# Patient Record
(demographics unavailable — no encounter records)

---

## 2024-12-02 NOTE — HISTORY OF PRESENT ILLNESS
[TextBox_4] :  (2 NVD's) LMP 24 Perineal tenderness ~1 wk ago. Entire vulva felt "hard" & tender - like riding a bike - mostly on left side. Saw her PCP who prescribed Bactrim. U/A, C&S showed Strep A UTI. Completing 7 days of abx tonight. Vulva much better. Only a small spot is still hard - on the left. No prior such episode.

## 2024-12-02 NOTE — PHYSICAL EXAM
[Chaperone Present] : A chaperone was present in the examining room during all aspects of the physical examination [No Lymphadenopathy] : no lymphadenopathy [Soft] : soft [Non-tender] : non-tender [No HSM] : No HSM [No Mass] : no mass [39679] : A chaperone was present during the pelvic exam. [No Lesions] : no lesions  [Labia Majora] : normal [Labia Minora] : normal [Normal] : normal [Anteversion] : anteverted [Uterine Adnexae] : normal [FreeTextEntry3] : No thyroid nodules [Tenderness] : nontender [Enlarged ___ wks] : not enlarged [FreeTextEntry1] : No erythema, edema, loss of pigment. [FreeTextEntry4] : mod amt of white mucoid d/c - Swab done for pathogens [FreeTextEntry5] : mobile; non-tender [FreeTextEntry9] : Patient points to an area 2 to 3 cm to the left of the anus where an approximately 1 cm diameter indurated nontender nodule is palpated.  She states that it was considerably larger and quite tender for a period of 1 to 2 weeks and has almost resolved concurrent with antibiotic treatment.

## 2024-12-02 NOTE — PHYSICAL EXAM
[Chaperone Present] : A chaperone was present in the examining room during all aspects of the physical examination [No Lymphadenopathy] : no lymphadenopathy [Soft] : soft [Non-tender] : non-tender [No HSM] : No HSM [No Mass] : no mass [22607] : A chaperone was present during the pelvic exam. [No Lesions] : no lesions  [Labia Majora] : normal [Labia Minora] : normal [Normal] : normal [Anteversion] : anteverted [Uterine Adnexae] : normal [FreeTextEntry3] : No thyroid nodules [Tenderness] : nontender [Enlarged ___ wks] : not enlarged [FreeTextEntry1] : No erythema, edema, loss of pigment. [FreeTextEntry4] : mod amt of white mucoid d/c - Swab done for pathogens [FreeTextEntry5] : mobile; non-tender [FreeTextEntry9] : Patient points to an area 2 to 3 cm to the left of the anus where an approximately 1 cm diameter indurated nontender nodule is palpated.  She states that it was considerably larger and quite tender for a period of 1 to 2 weeks and has almost resolved concurrent with antibiotic treatment.

## 2024-12-02 NOTE — ASSESSMENT
[TextEntry] : Patient has vaginal discharge which is being evaluated and a perirectal area of induration which may represent a resolving perirectal abscess. If this recurs, a GI consultation was recommended.